# Patient Record
Sex: FEMALE | Race: WHITE | Employment: FULL TIME | URBAN - NONMETROPOLITAN AREA
[De-identification: names, ages, dates, MRNs, and addresses within clinical notes are randomized per-mention and may not be internally consistent; named-entity substitution may affect disease eponyms.]

---

## 2022-01-21 ENCOUNTER — HOSPITAL ENCOUNTER (EMERGENCY)
Age: 22
Discharge: HOME | End: 2022-01-21
Payer: COMMERCIAL

## 2022-01-21 VITALS
HEART RATE: 81 BPM | RESPIRATION RATE: 17 BRPM | SYSTOLIC BLOOD PRESSURE: 137 MMHG | DIASTOLIC BLOOD PRESSURE: 75 MMHG | TEMPERATURE: 99 F | OXYGEN SATURATION: 100 % | HEIGHT: 64 IN | BODY MASS INDEX: 28.17 KG/M2 | WEIGHT: 165 LBS

## 2022-01-21 DIAGNOSIS — J02.9 VIRAL PHARYNGITIS: Primary | ICD-10-CM

## 2022-01-21 DIAGNOSIS — H66.90 ACUTE OTITIS MEDIA, UNSPECIFIED OTITIS MEDIA TYPE: ICD-10-CM

## 2022-01-21 DIAGNOSIS — U07.1 COVID-19: ICD-10-CM

## 2022-01-21 LAB
FLU A ANTIGEN: NEGATIVE
FLU B ANTIGEN: NEGATIVE
GROUP A STREP CULTURE, REFLEX: NEGATIVE
REFLEX THROAT C + S: NORMAL
SARS-COV-2, NAAT: DETECTED

## 2022-01-21 PROCEDURE — 87804 INFLUENZA ASSAY W/OPTIC: CPT

## 2022-01-21 PROCEDURE — 87635 SARS-COV-2 COVID-19 AMP PRB: CPT

## 2022-01-21 PROCEDURE — 87880 STREP A ASSAY W/OPTIC: CPT

## 2022-01-21 PROCEDURE — 6370000000 HC RX 637 (ALT 250 FOR IP): Performed by: NURSE PRACTITIONER

## 2022-01-21 PROCEDURE — 99283 EMERGENCY DEPT VISIT LOW MDM: CPT

## 2022-01-21 PROCEDURE — 87070 CULTURE OTHR SPECIMN AEROBIC: CPT

## 2022-01-21 RX ORDER — AMOXICILLIN 250 MG/1
500 CAPSULE ORAL ONCE
Status: COMPLETED | OUTPATIENT
Start: 2022-01-21 | End: 2022-01-21

## 2022-01-21 RX ORDER — AMOXICILLIN 500 MG/1
500 CAPSULE ORAL 2 TIMES DAILY
Qty: 14 CAPSULE | Refills: 0 | Status: SHIPPED | OUTPATIENT
Start: 2022-01-21 | End: 2022-01-28

## 2022-01-21 RX ADMIN — AMOXICILLIN 500 MG: 250 CAPSULE ORAL at 20:54

## 2022-01-21 ASSESSMENT — ENCOUNTER SYMPTOMS
NAUSEA: 0
COUGH: 1
SHORTNESS OF BREATH: 0
RHINORRHEA: 0
EYE PAIN: 0
DIARRHEA: 0
WHEEZING: 0
VOMITING: 0
CONSTIPATION: 0
ABDOMINAL PAIN: 0
BLOOD IN STOOL: 0
SORE THROAT: 1

## 2022-01-21 ASSESSMENT — PAIN DESCRIPTION - LOCATION: LOCATION: EAR;THROAT

## 2022-01-21 ASSESSMENT — PAIN SCALES - GENERAL: PAINLEVEL_OUTOF10: 8

## 2022-01-21 ASSESSMENT — PAIN DESCRIPTION - PAIN TYPE: TYPE: ACUTE PAIN

## 2022-01-21 NOTE — Clinical Note
Jody Sherman was seen and treated in our emergency department on 1/21/2022. COVID19 virus is suspected. Per the CDC guidelines we recommend home isolation until the following conditions are all met:    1. At least five days have passed since symptoms first appeared and/or had a close exposure,   2. After home isolation for five days, wearing a mask around others for the next five days,  3. At least 24 have passed since last fever without the use of fever-reducing medications and  4. Symptoms (eg cough, shortness of breath) have improved    If you have any questions or concerns, please don't hesitate to call. She may return to work/school on 01/26/2022    Tested positive for COVID-19 on 1/21/2022.     KIM Oconnell - CNP

## 2022-01-22 NOTE — ED PROVIDER NOTES
[address] [facility] [address] [address] EMERGENCY DEPT  EMERGENCY MEDICINE     Pt Name: Taras Karimi  MRN: [id number]  [place] 2000  Date of evaluation: 1/21/2022  PCP:    No primary care provider on file. Provider: KIM Corrales CNP    CHIEF COMPLAINT       Chief Complaint   Patient presents with    Pharyngitis    Otalgia    Cough           HISTORY OF PRESENT ILLNESS    Chey Galindo is a 80-year-old female patient that presents to ER with complaint of cough, sore throat and bilateral ear pain. She states that this started about 5 days ago. She states that the ear pain has gotten progressively worse and she has noticed some drainage in the back of her throat. She denies chest pain, shortness of breath, nausea, vomiting or diarrhea. Triage notes and Nursing notes were reviewed by myself. Any discrepancies are addressed above. PAST MEDICAL HISTORY   History reviewed. No pertinent past medical history. SURGICAL HISTORY       Past Surgical History:   Procedure Laterality Date    ANKLE SURGERY         CURRENT MEDICATIONS       Previous Medications    No medications on file       ALLERGIES     No Known Allergies    FAMILY HISTORY     History reviewed. No pertinent family history.      SOCIAL HISTORY       Social History     Socioeconomic History    Marital status: None     Spouse name: None    Number of children: None    Years of education: None    Highest education level: None   Occupational History    None   Tobacco Use    Smoking status: Never Smoker    Smokeless tobacco: Never Used   Vaping Use    Vaping Use: Every day    Substances: Nicotine   Substance and Sexual Activity    Alcohol use: Not Currently    Drug use: Never    Sexual activity: Yes     Partners: Male   Other Topics Concern    None   Social History Narrative    None     Social Determinants of Health     Financial Resource Strain:     Difficulty of Paying Living Expenses: Not on file   Food Insecurity:     Worried About Running Out of Food in Hematological: Does not bruise/bleed easily. Except as noted above the remainder of the review of systems was reviewed and is negative. SCREENINGS                        PHYSICAL EXAM    (up to 7 for level 4, 8 or more for level 5)     ED Triage Vitals [01/21/22 1952]   BP Temp Temp Source Pulse Resp SpO2 Height Weight   137/75 99 °F (37.2 °C) Oral 97 20 99 % 5' 4\" (1.626 m) 165 lb (74.8 kg)       Physical Exam  Vitals and nursing note reviewed. Constitutional:       Appearance: She is well-developed. HENT:      Head: Normocephalic and atraumatic. Right Ear: Tenderness present. No mastoid tenderness. Tympanic membrane is erythematous. Left Ear: Tenderness present. No mastoid tenderness. Tympanic membrane is erythematous. Nose: Rhinorrhea present. No congestion. Mouth/Throat:      Mouth: Mucous membranes are moist.      Pharynx: Pharyngeal swelling and posterior oropharyngeal erythema present. Tonsils: No tonsillar exudate or tonsillar abscesses. 2+ on the right. 2+ on the left. Eyes:      Conjunctiva/sclera: Conjunctivae normal.      Pupils: Pupils are equal, round, and reactive to light. Cardiovascular:      Rate and Rhythm: Normal rate and regular rhythm. Heart sounds: Normal heart sounds. No murmur heard. No gallop. Pulmonary:      Effort: Pulmonary effort is normal. No respiratory distress. Breath sounds: Normal breath sounds. No wheezing or rales. Abdominal:      General: Bowel sounds are normal.      Palpations: Abdomen is soft. Musculoskeletal:         General: Normal range of motion. Cervical back: Normal range of motion. Skin:     General: Skin is warm and dry. Capillary Refill: Capillary refill takes less than 2 seconds. Neurological:      Mental Status: She is alert and oriented to person, place, and time.            DIAGNOSTIC RESULTS     EKG:(none if blank)  All EKGs are interpreted by the Emergency Department Physician who either signs or Co-signs this chart in the absence of a cardiologist.        RADIOLOGY: (none if blank)   I directly visualized the following images and reviewed the radiologist interpretations. Interpretation per the Radiologist below, if available at the time of this note:  No orders to display       LABS:  Labs Reviewed   RAPID INFLUENZA A/B ANTIGENS   COVID-19, RAPID   GROUP A STREP, REFLEX       All other labs were within normal range or not returned as of this dictation. Please note, any cultures that may have been sent were not resulted at the time of this patient visit. EMERGENCY DEPARTMENT COURSE and Medical Decision Making:     Vitals:    Vitals:    01/21/22 1952   BP: 137/75   Pulse: 97   Resp: 20   Temp: 99 °F (37.2 °C)   TempSrc: Oral   SpO2: 99%   Weight: 165 lb (74.8 kg)   Height: 5' 4\" (1.626 m)       PROCEDURES: (None if blank)  Procedures       MDM  Number of Diagnoses or Management Options  Viral pharyngitis: new, needed workup     Amount and/or Complexity of Data Reviewed  Clinical lab tests: ordered and reviewed    Risk of Complications, Morbidity, and/or Mortality  Presenting problems: minimal  Diagnostic procedures: low  Management options: minimal    Patient presents to ER with complaint of bilateral ear pain and sore throat. COVID-19 testing returns positive. Influenza and rapid strep testing  Patient does have otitis media. Patient will be treated with amoxicillin. Patient is instructed to follow-up with primary care provider. Patient to take over-the-counter pain medication such as Tylenol or ibuprofen as needed for pain or fever. Strict return precautions and follow up instructions were discussed with the patient with which the patient agrees    ED Medications administered this visit:    Medications   amoxicillin (AMOXIL) capsule 500 mg (has no administration in time range)         FINAL IMPRESSION      1. Viral pharyngitis    2.  Acute otitis media, unspecified otitis media type DISPOSITION/PLAN   DISPOSITION        PATIENT REFERRED TO:  No follow-up provider specified.     DISCHARGE MEDICATIONS:  New Prescriptions    AMOXICILLIN (AMOXIL) 500 MG CAPSULE    Take 1 capsule by mouth 2 times daily for 7 days              KIM Gonzalez CNP (electronically signed)            KIM Gonzalez CNP  01/21/22 6021

## 2022-01-22 NOTE — ED TRIAGE NOTES
Pt comes to ED from home with c/o sore throat and ear pain. PT states she thought it was her sinuses. Pt states she woke up today with a cough. Pt states she is having a little bit of drainage in the back of her throat as well. Pt states this all started a week ago but it has all progress. RR unlabored pt calm in chair.

## 2022-01-23 LAB — THROAT/NOSE CULTURE: NORMAL
